# Patient Record
Sex: MALE | Race: WHITE | NOT HISPANIC OR LATINO | URBAN - METROPOLITAN AREA
[De-identification: names, ages, dates, MRNs, and addresses within clinical notes are randomized per-mention and may not be internally consistent; named-entity substitution may affect disease eponyms.]

---

## 2020-03-05 ENCOUNTER — TELEPHONE (OUTPATIENT)
Dept: OTHER | Facility: OTHER | Age: 45
End: 2020-03-05

## 2020-03-05 NOTE — TELEPHONE ENCOUNTER
03/05/2020  12:09 PM    Called and spoke to patient who was No Show for 1200 Virtual Visit.  He said he forgot and was busy now. He said he will reschedule through the [facility]. States likely only interested in the HTN program as he is only pre-diabetic.      Dariela Noonan, MPH, PA-C  [facility]  [phone]

## 2025-08-26 ENCOUNTER — TELEPHONE (OUTPATIENT)
Dept: ALLERGY | Facility: CLINIC | Age: 50
End: 2025-08-26
Payer: COMMERCIAL